# Patient Record
Sex: MALE | Race: BLACK OR AFRICAN AMERICAN | NOT HISPANIC OR LATINO | Employment: UNEMPLOYED | URBAN - METROPOLITAN AREA
[De-identification: names, ages, dates, MRNs, and addresses within clinical notes are randomized per-mention and may not be internally consistent; named-entity substitution may affect disease eponyms.]

---

## 2017-04-07 ENCOUNTER — HOSPITAL ENCOUNTER (EMERGENCY)
Facility: HOSPITAL | Age: 52
Discharge: HOME | End: 2017-04-07
Attending: EMERGENCY MEDICINE
Payer: MEDICAID

## 2017-04-07 VITALS
HEIGHT: 71 IN | WEIGHT: 165 LBS | BODY MASS INDEX: 23.1 KG/M2 | HEART RATE: 71 BPM | TEMPERATURE: 98 F | RESPIRATION RATE: 20 BRPM | SYSTOLIC BLOOD PRESSURE: 121 MMHG | DIASTOLIC BLOOD PRESSURE: 69 MMHG | OXYGEN SATURATION: 100 %

## 2017-04-07 DIAGNOSIS — J03.90 ACUTE TONSILLITIS, UNSPECIFIED ETIOLOGY: Primary | ICD-10-CM

## 2017-04-07 LAB
ALBUMIN SERPL BCP-MCNC: 3.7 G/DL
ALP SERPL-CCNC: 60 U/L
ALT SERPL W/O P-5'-P-CCNC: 17 U/L
ANION GAP SERPL CALC-SCNC: 5 MMOL/L
AST SERPL-CCNC: 15 U/L
BASOPHILS # BLD AUTO: 0.02 K/UL
BASOPHILS NFR BLD: 0.2 %
BILIRUB SERPL-MCNC: 1.6 MG/DL
BUN SERPL-MCNC: 8 MG/DL
CALCIUM SERPL-MCNC: 9.2 MG/DL
CHLORIDE SERPL-SCNC: 104 MMOL/L
CO2 SERPL-SCNC: 29 MMOL/L
CREAT SERPL-MCNC: 0.9 MG/DL
DEPRECATED S PYO AG THROAT QL EIA: NEGATIVE
DIFFERENTIAL METHOD: ABNORMAL
EOSINOPHIL # BLD AUTO: 0.1 K/UL
EOSINOPHIL NFR BLD: 1.2 %
ERYTHROCYTE [DISTWIDTH] IN BLOOD BY AUTOMATED COUNT: 13 %
EST. GFR  (AFRICAN AMERICAN): >60 ML/MIN/1.73 M^2
EST. GFR  (NON AFRICAN AMERICAN): >60 ML/MIN/1.73 M^2
GLUCOSE SERPL-MCNC: 100 MG/DL
HCT VFR BLD AUTO: 44 %
HGB BLD-MCNC: 14.9 G/DL
LYMPHOCYTES # BLD AUTO: 1.4 K/UL
LYMPHOCYTES NFR BLD: 12.1 %
MCH RBC QN AUTO: 30.8 PG
MCHC RBC AUTO-ENTMCNC: 33.9 %
MCV RBC AUTO: 91 FL
MONOCYTES # BLD AUTO: 1 K/UL
MONOCYTES NFR BLD: 8.2 %
NEUTROPHILS # BLD AUTO: 9.3 K/UL
NEUTROPHILS NFR BLD: 78 %
PLATELET # BLD AUTO: 260 K/UL
PMV BLD AUTO: 10.1 FL
POTASSIUM SERPL-SCNC: 4.2 MMOL/L
PROT SERPL-MCNC: 7.4 G/DL
RBC # BLD AUTO: 4.83 M/UL
SODIUM SERPL-SCNC: 138 MMOL/L
WBC # BLD AUTO: 11.88 K/UL

## 2017-04-07 PROCEDURE — 96365 THER/PROPH/DIAG IV INF INIT: CPT

## 2017-04-07 PROCEDURE — 87081 CULTURE SCREEN ONLY: CPT

## 2017-04-07 PROCEDURE — 99284 EMERGENCY DEPT VISIT MOD MDM: CPT | Mod: 25

## 2017-04-07 PROCEDURE — 25500020 PHARM REV CODE 255: Performed by: EMERGENCY MEDICINE

## 2017-04-07 PROCEDURE — 85025 COMPLETE CBC W/AUTO DIFF WBC: CPT

## 2017-04-07 PROCEDURE — 87880 STREP A ASSAY W/OPTIC: CPT

## 2017-04-07 PROCEDURE — 80053 COMPREHEN METABOLIC PANEL: CPT

## 2017-04-07 PROCEDURE — 25000003 PHARM REV CODE 250: Performed by: PHYSICIAN ASSISTANT

## 2017-04-07 RX ORDER — CLINDAMYCIN HYDROCHLORIDE 300 MG/1
300 CAPSULE ORAL 3 TIMES DAILY
Qty: 21 CAPSULE | Refills: 0 | Status: SHIPPED | OUTPATIENT
Start: 2017-04-07 | End: 2017-04-14

## 2017-04-07 RX ORDER — CLINDAMYCIN PHOSPHATE 600 MG/50ML
600 INJECTION, SOLUTION INTRAVENOUS
Status: COMPLETED | OUTPATIENT
Start: 2017-04-07 | End: 2017-04-07

## 2017-04-07 RX ORDER — IBUPROFEN 600 MG/1
600 TABLET ORAL EVERY 6 HOURS PRN
Qty: 20 TABLET | Refills: 0 | Status: SHIPPED | OUTPATIENT
Start: 2017-04-07 | End: 2018-09-08

## 2017-04-07 RX ORDER — TRAMADOL HYDROCHLORIDE 50 MG/1
50 TABLET ORAL EVERY 6 HOURS PRN
Qty: 12 TABLET | Refills: 0 | Status: SHIPPED | OUTPATIENT
Start: 2017-04-07 | End: 2017-04-17

## 2017-04-07 RX ADMIN — CLINDAMYCIN IN 5 PERCENT DEXTROSE 600 MG: 12 INJECTION, SOLUTION INTRAVENOUS at 02:04

## 2017-04-07 RX ADMIN — IOHEXOL 75 ML: 350 INJECTION, SOLUTION INTRAVENOUS at 01:04

## 2017-04-07 NOTE — ED PROVIDER NOTES
Encounter Date: 4/7/2017       History     Chief Complaint   Patient presents with    Sore Throat     pt c/o swollen tonsils since yesterday.  Pt denies fever     Review of patient's allergies indicates:  No Known Allergies  HPI Comments: Rolando Rizvi 52 y.o. Male with no reported PMH or recent medical care presented to the ED with c/o left-sided throat pain for the past one day. He reports that he feels like his tonsils are swollen and that he has lymphadenopathy of the affected side. She c/o pain with swallowing however denies any difficulty swallowing. He denies fever, chills, headache, nausea, vomiting congestion, SOB or cough. He did not try any medications for the symptoms.     The history is provided by the patient.     History reviewed. No pertinent past medical history.  No past surgical history on file.  History reviewed. No pertinent family history.  Social History   Substance Use Topics    Smoking status: None    Smokeless tobacco: None    Alcohol use None     Review of Systems   Constitutional: Positive for appetite change. Negative for activity change, chills and fever.   HENT: Positive for congestion, postnasal drip and sore throat. Negative for facial swelling, trouble swallowing and voice change.    Eyes: Negative for visual disturbance.   Respiratory: Negative for cough, shortness of breath and wheezing.    Cardiovascular: Negative for chest pain.   Gastrointestinal: Negative for abdominal pain, nausea and vomiting.   Genitourinary: Negative for dysuria.   Musculoskeletal: Negative for back pain, neck pain and neck stiffness.   Skin: Negative for rash.   Neurological: Negative for weakness and headaches.   Hematological: Positive for adenopathy. Does not bruise/bleed easily.       Physical Exam   Initial Vitals   BP Pulse Resp Temp SpO2   04/07/17 1123 04/07/17 1121 04/07/17 1121 04/07/17 1121 04/07/17 1121   111/60 80 16 98.9 °F (37.2 °C) 100 %     Physical Exam    Nursing note and vitals  reviewed.  Constitutional: Vital signs are normal. He appears well-developed and well-nourished. He is cooperative.  Non-toxic appearance. He does not appear ill. No distress.   HENT:   Head: Normocephalic and atraumatic.   Right Ear: Tympanic membrane normal.   Left Ear: Tympanic membrane normal.   Nose: Mucosal edema present.   Mouth/Throat: Mucous membranes are normal. No uvula swelling.   erythema of the oropharynx with edema of the left tonsil with slight uvula deviation; no exudate noted.    Eyes: Conjunctivae and lids are normal.   Neck: Trachea normal and normal range of motion. Neck supple. No stridor present. No tracheal deviation present.   Cardiovascular: Normal rate and regular rhythm.   Pulmonary/Chest: Breath sounds normal. No respiratory distress. He has no wheezes. He has no rhonchi.   Abdominal: Soft. Normal appearance.   Musculoskeletal: Normal range of motion.   Lymphadenopathy:        Head (left side): Tonsillar adenopathy present.     He has cervical adenopathy.   Neurological: He is alert and oriented to person, place, and time. He has normal strength. GCS eye subscore is 4. GCS verbal subscore is 5. GCS motor subscore is 6.   Skin: Skin is warm, dry and intact. No rash noted.   Psychiatric: He has a normal mood and affect. His speech is normal and behavior is normal. Thought content normal.         ED Course   Procedures  Labs Reviewed   THROAT SCREEN, RAPID   CBC W/ AUTO DIFFERENTIAL   COMPREHENSIVE METABOLIC PANEL     Imaging Results         CT Soft Tissue Neck With Contrast (Final result) Result time:  04/07/17 13:41:08    Final result by Yasir Teran MD (04/07/17 13:41:08)    Impression:        1.  Findings suggestive of left-sided palatine tonsillitis.  Given the patient's age neoplasm  not totally excluded.  Clinical correlation for acute tonsillitis suggested.  2.  Multiple less than 1 cm left cervical chain lymphadenopathy that could be inflammatory.  Other causes of lymphadenopathy  not excluded.  3.  Rest of examination is unremarkable.      Electronically signed by: GAYLE HDZ MD  Date:     04/07/17  Time:    13:41     Narrative:    History: Left neck pain.    Procedure: Axial CT scans of the head are performed from the mid cranium through to the aortic arch after patient was given intravenous iodinated contrast.  Coronal and sagittal reformats are performed.    Comparison study: None.    Findings were    There is an enlarged left palatine tonsil this measures approximately 2.3 cm in its transverse diameter.  There is a inhomogeneous low-attenuation focus in the palatine tonsil representing changes from tonsillitis.  The peritoneal soft tissues lateral to the tonsil demonstrates no infiltration to suggest peritonsillar abscesses.  The right palatine tonsil and the right peritoneal space is unremarkable.    There are multiple cervical nodes in the level V as well as level III and level IV chain of lymph nodes on the left side.  There are a couple of left supraclavicular nodes.  There are also small less pronounced nodes in the right L5 chain of lymph nodes.  It is felt that these are most likely inflammatory nodes.    The nasopharynx, the parapharyngeal soft tissues,  muscles and the salivary glands are within normal limits bilaterally.  There is normal larynx and the thyroid gland.  Prevertebral soft tissues are unremarkable.  There is spondylosis in the cervical spine.    Incidental note of approximately 2.3 cm subcutaneous cystic mass in the posterior neck at approximately the C3 vertebral body representing a sebaceous cyst is noted.                Rolando Rizvi 52 y.o. 52 Y.o. Male with no reported PMH or recent medical care presented to the ED with c/o left-sided throat pain for the past one day. He reports that he feels like his tonsils are swollen and that he has lymphadenopathy of the affected side. She c/o pain with swallowing however denies any difficulty swallowing. He  denies fever, chills, headache, nausea, vomiting congestion, SOB or cough. He did not try any medications for the symptoms.  ROS positive for sore throat.  Physical exam reveals patient well appearing in no obvious distress. Erythema of the oropharynx with edema of the left tonsil with slight uvula deviation. No uvula swelling, sublingual swelling, posterior oropharyngeal edema or exudate. Left-sided lymphadenopathy noted. Neck with FROM and no rigidity. Lungs clear, heart regular rate and rhythm.     DDX: tonsillitis strep vs. Viral, PTA    ED management: strep screen negative. Labs and CT as suspicion for PTA. CT with findings consistent with palatine tonsillitis; lymphadenopathy noted. Cleocin in the Ed and will send home with same and instructions on follow up.    Impression/Plan: Patient informed of diagnosis The encounter diagnosis was Acute tonsillitis, unspecified etiology.  Discharged with cleocin, motrin and ultram. Patient will follow up with Primary.  Patient cautioned on when to return to ED.  Pt. Understands and agrees with current treatment plan                        Attending Attestation:     Physician Attestation Statement for NP/PA:   I discussed this assessment and plan of this patient with the NP/PA, but I did not personally examine the patient. The face to face encounter was performed by the NP/PA.    Other NP/PA Attestation Additions:    History of Present Illness: ST x 1 day    Medical Decision Making: Tonsillar asymmetry on exam, concern for PTA.   No PTA on CT of neck - treat tonsillitis with cleocin.                 ED Course     Clinical Impression:   The encounter diagnosis was Acute tonsillitis, unspecified etiology.          KEILA Ji  04/10/17 1008       Diane Lopez MD  04/23/17 9530

## 2017-04-07 NOTE — ED AVS SNAPSHOT
[facility]  [address] [place] [address]               Rolando Rizvi   2017 12:22 PM   ED    Description:  Male : 1965   Department:  [facility]           Your Care was Coordinated By:     Provider Role From To    Diane Lopez MD Attending Provider 17 1234 --    KEILA Ji Physician Assistant 17 1228 --      Reason for Visit     Sore Throat           Diagnoses this Visit        Comments    Acute tonsillitis, unspecified etiology    -  Primary left [facility]      ED Disposition     None           To Do List           Follow-up Information     Follow up with [facility]. Go in 3 days.    Specialty:  Family Medicine    Contact information:    [address], [address]  [facility] [address] [phone]       These Medications        Disp Refills Start End    clindamycin (CLEOCIN) 300 MG capsule 21 capsule 0 2017    Take 1 capsule (300 mg total) by mouth 3 (three) times daily. - Oral    ibuprofen (ADVIL,MOTRIN) 600 MG tablet 20 tablet 0 2017     Take 1 tablet (600 mg total) by mouth every 6 (six) hours as needed for Pain. - Oral    tramadol (ULTRAM) 50 mg tablet 12 tablet 0 2017    Take 1 tablet (50 mg total) by mouth every 6 (six) hours as needed for Pain. - Oral      [facility]     [facility] Nurse Care Line - 24/ Assistance  Unless otherwise directed by your provider, please contact [facility], our nurse care line that is available for  assistance.     Registered nurses in the [facility] provide: appointment scheduling, clinical advisement, health education, and other advisory services.  Call: [phone] (toll free)               Medications           Message regarding Medications     Verify the changes and/or additions to your medication regime listed below are the same as discussed with your clinician today.   "If any of these changes or additions are incorrect, please notify your healthcare provider.        START taking these NEW medications        Refills    clindamycin (CLEOCIN) 300 MG capsule 0    Sig: Take 1 capsule (300 mg total) by mouth 3 (three) times daily.    Class: Print    Route: Oral    ibuprofen (ADVIL,MOTRIN) 600 MG tablet 0    Sig: Take 1 tablet (600 mg total) by mouth every 6 (six) hours as needed for Pain.    Class: Print    Route: Oral    tramadol (ULTRAM) 50 mg tablet 0    Sig: Take 1 tablet (50 mg total) by mouth every 6 (six) hours as needed for Pain.    Class: Print    Route: Oral      These medications were administered today        Dose Freq    omnipaque 350 iohexol 75 mL 75 mL IMG once as needed    Sig: Inject 75 mLs into the vein ONCE PRN for contrast.    Class: Normal    Route: Intravenous    clindamycin 600 MG/50 ML D5W 600 mg/50 mL IVPB 600 mg 600 mg ED 1 Time    Sig: Inject 50 mLs (600 mg total) into the vein ED 1 Time.    Class: Normal    Route: Intravenous    Cosign for Ordering: Required by Diane Lopez MD           Verify that the below list of medications is an accurate representation of the medications you are currently taking.  If none reported, the list may be blank. If incorrect, please contact your healthcare provider. Carry this list with you in case of emergency.           Current Medications     clindamycin (CLEOCIN) 300 MG capsule Take 1 capsule (300 mg total) by mouth 3 (three) times daily.    clindamycin 600 MG/50 ML D5W 600 mg/50 mL IVPB 600 mg Inject 50 mLs (600 mg total) into the vein ED 1 Time.    ibuprofen (ADVIL,MOTRIN) 600 MG tablet Take 1 tablet (600 mg total) by mouth every 6 (six) hours as needed for Pain.    tramadol (ULTRAM) 50 mg tablet Take 1 tablet (50 mg total) by mouth every 6 (six) hours as needed for Pain.           Clinical Reference Information           Your Vitals Were     BP Pulse Temp Resp Height Weight    122/77 71 98.7 °F (37.1 °C) (Oral) 20 5' 11" " (1.803 m) 74.8 kg (165 lb)    SpO2 BMI             100% 23.01 kg/m2         Allergies as of 4/7/2017     No Known Allergies      Immunizations Administered on Date of Encounter - 4/7/2017     None      ED Micro, Lab, POCT     Start Ordered       Status Ordering Provider    04/07/17 1125 04/07/17 1124  Rapid strep screen  STAT      Final result     04/07/17 1125 04/07/17 1125  CBC auto differential  STAT      Final result     04/07/17 1125 04/07/17 1125  Comprehensive metabolic panel  STAT      Final result     04/07/17 1124 04/07/17 1124  Strep A culture, throat  Once      In process       ED Imaging Orders     Start Ordered       Status Ordering Provider    04/07/17 1229 04/07/17 1229  CT Soft Tissue Neck With Contrast  1 time imaging      Final result     04/07/17 1125 04/07/17 1125    1 time imaging,   Status:  Canceled      Canceled       Discharge References/Attachments     PHARYNGITIS, REPORT PENDING (ENGLISH)      [facility] Sign-Up     Activating your [facility] account is as easy as 1-2-3!     1) Visit [email or web address], select Sign Up Now, enter this activation code and your date of birth, then select Next.  [email or web address]  Expires: 5/22/2017  2:28 PM      2) Create a username and password to use when you visit [facility] in the future and select a security question in case you lose your password and select Next.    3) Enter your e-mail address and click Sign Up!    Additional Information  If you have questions, please e-mail [email or web address] or call [phone] to talk to our [facility] staff. Remember, [facility] is NOT to be used for urgent needs. For medical emergencies, dial 911.         Smoking Cessation     If you would like to quit smoking:   You may be eligible for free services if you are a [place] resident and started smoking cigarettes before September 1, 1988.  Call the Smoking Cessation [employer] (SCT) toll free at (793) 158-5546 or (917) 231-5036.   Call 2-800-QUIT-NOW if you do not meet  the above criteria.   Contact us via email: [email or web address]   View our website for more information: [email or web address]         [facility] complies with applicable Federal civil rights laws and does not discriminate on the basis of race, color, national origin, age, disability, or sex.        Language Assistance Services     ATTENTION: Language assistance services are available, free of charge. Please call [phone].      ATENCIÓN: Si habla español, tiene a chatterjee disposición servicios gratuitos de asistencia lingüística. Llame al [phone].     CHÚ Ý: N?u b?n nói Ti?ng Vi?t, có các d?ch v? h? tr? ngôn ng? mi?n phí dành cho b?n. G?i s? [phone].

## 2017-04-09 LAB — BACTERIA THROAT CULT: NORMAL

## 2018-09-08 ENCOUNTER — HOSPITAL ENCOUNTER (EMERGENCY)
Facility: HOSPITAL | Age: 53
Discharge: HOME | End: 2018-09-08
Attending: EMERGENCY MEDICINE
Payer: MEDICAID

## 2018-09-08 VITALS
OXYGEN SATURATION: 99 % | HEIGHT: 71 IN | RESPIRATION RATE: 16 BRPM | SYSTOLIC BLOOD PRESSURE: 117 MMHG | WEIGHT: 165 LBS | HEART RATE: 88 BPM | DIASTOLIC BLOOD PRESSURE: 66 MMHG | TEMPERATURE: 100 F | BODY MASS INDEX: 23.1 KG/M2

## 2018-09-08 DIAGNOSIS — R60.9 EDEMA: ICD-10-CM

## 2018-09-08 DIAGNOSIS — L03.115 CELLULITIS OF RIGHT LOWER LEG: Primary | ICD-10-CM

## 2018-09-08 LAB
ALBUMIN SERPL BCP-MCNC: 2.7 G/DL
ALP SERPL-CCNC: 80 U/L
ALT SERPL W/O P-5'-P-CCNC: 19 U/L
ANION GAP SERPL CALC-SCNC: 9 MMOL/L
AST SERPL-CCNC: 20 U/L
BASOPHILS # BLD AUTO: 0 K/UL
BASOPHILS NFR BLD: 0 %
BILIRUB SERPL-MCNC: 0.6 MG/DL
BUN SERPL-MCNC: 10 MG/DL
CALCIUM SERPL-MCNC: 8.7 MG/DL
CHLORIDE SERPL-SCNC: 96 MMOL/L
CO2 SERPL-SCNC: 26 MMOL/L
CREAT SERPL-MCNC: 1 MG/DL
DIFFERENTIAL METHOD: ABNORMAL
EOSINOPHIL # BLD AUTO: 0 K/UL
EOSINOPHIL NFR BLD: 0.2 %
ERYTHROCYTE [DISTWIDTH] IN BLOOD BY AUTOMATED COUNT: 12.7 %
EST. GFR  (AFRICAN AMERICAN): >60 ML/MIN/1.73 M^2
EST. GFR  (NON AFRICAN AMERICAN): >60 ML/MIN/1.73 M^2
GLUCOSE SERPL-MCNC: 249 MG/DL
HCT VFR BLD AUTO: 35.5 %
HGB BLD-MCNC: 11.8 G/DL
LYMPHOCYTES # BLD AUTO: 0.8 K/UL
LYMPHOCYTES NFR BLD: 8.9 %
MCH RBC QN AUTO: 29.8 PG
MCHC RBC AUTO-ENTMCNC: 33.2 G/DL
MCV RBC AUTO: 90 FL
MONOCYTES # BLD AUTO: 0.4 K/UL
MONOCYTES NFR BLD: 4.9 %
NEUTROPHILS # BLD AUTO: 7.2 K/UL
NEUTROPHILS NFR BLD: 85.6 %
PLATELET # BLD AUTO: 218 K/UL
PMV BLD AUTO: 10.1 FL
POCT GLUCOSE: 246 MG/DL (ref 70–110)
POTASSIUM SERPL-SCNC: 3.5 MMOL/L
PROT SERPL-MCNC: 6.6 G/DL
RBC # BLD AUTO: 3.96 M/UL
SODIUM SERPL-SCNC: 131 MMOL/L
WBC # BLD AUTO: 8.38 K/UL

## 2018-09-08 PROCEDURE — 96365 THER/PROPH/DIAG IV INF INIT: CPT

## 2018-09-08 PROCEDURE — 85025 COMPLETE CBC W/AUTO DIFF WBC: CPT

## 2018-09-08 PROCEDURE — 25000003 PHARM REV CODE 250: Performed by: PHYSICIAN ASSISTANT

## 2018-09-08 PROCEDURE — 82962 GLUCOSE BLOOD TEST: CPT

## 2018-09-08 PROCEDURE — 99284 EMERGENCY DEPT VISIT MOD MDM: CPT | Mod: 25

## 2018-09-08 PROCEDURE — S0077 INJECTION, CLINDAMYCIN PHOSP: HCPCS | Performed by: PHYSICIAN ASSISTANT

## 2018-09-08 PROCEDURE — 80053 COMPREHEN METABOLIC PANEL: CPT

## 2018-09-08 RX ORDER — TRAMADOL HYDROCHLORIDE 50 MG/1
50 TABLET ORAL EVERY 6 HOURS PRN
Qty: 12 TABLET | Refills: 0 | Status: SHIPPED | OUTPATIENT
Start: 2018-09-08 | End: 2018-09-18

## 2018-09-08 RX ORDER — IBUPROFEN 600 MG/1
600 TABLET ORAL EVERY 6 HOURS PRN
Qty: 20 TABLET | Refills: 0 | Status: SHIPPED | OUTPATIENT
Start: 2018-09-08 | End: 2019-02-06

## 2018-09-08 RX ORDER — CLINDAMYCIN PHOSPHATE 600 MG/50ML
600 INJECTION, SOLUTION INTRAVENOUS
Status: COMPLETED | OUTPATIENT
Start: 2018-09-08 | End: 2018-09-08

## 2018-09-08 RX ORDER — CLINDAMYCIN HYDROCHLORIDE 300 MG/1
300 CAPSULE ORAL 3 TIMES DAILY
Qty: 21 CAPSULE | Refills: 0 | Status: SHIPPED | OUTPATIENT
Start: 2018-09-08 | End: 2018-09-15

## 2018-09-08 RX ORDER — ASPIRIN 325 MG
325 TABLET, DELAYED RELEASE (ENTERIC COATED) ORAL DAILY
COMMUNITY

## 2018-09-08 RX ADMIN — CLINDAMYCIN IN 5 PERCENT DEXTROSE 600 MG: 12 INJECTION, SOLUTION INTRAVENOUS at 01:09

## 2018-09-08 RX ADMIN — SODIUM CHLORIDE 1000 ML: 0.9 INJECTION, SOLUTION INTRAVENOUS at 02:09

## 2018-09-08 NOTE — ED NOTES
RN asked Irene PEDRAZA if blood cultures were needed before initiation of antibiotics; verbal order received that no blood cultures need to be drawn on patient.

## 2018-09-08 NOTE — ED PROVIDER NOTES
Encounter Date: 9/8/2018       History     Chief Complaint   Patient presents with    Leg Swelling     RLE swollen and warm to touch x 2 days, denies trauma      Afebrile 53-year-old male who is a daily smoker with no reported past medical history or recent medical care presents the ED for evaluation of right lower leg pain.  He did have any recent trauma and states that over the past 2 days he began with some gradual swelling and pain of the lower leg.  He states that since it is onset the symptoms have gradually worsened.  He does also report redness and warmth of the area.  He complains of associated subjective fever.  He denies any other symptoms including chills, chest pain, shortness of breath, nausea, vomiting, diarrhea or pain radiation.  He is able to ambulate on affected lower extremity and denies any numbness, tingling or decreased range of motion. He has not tried any medications for the symptoms.      The history is provided by the patient.     Review of patient's allergies indicates:  No Known Allergies  No past medical history on file.  No past surgical history on file.  No family history on file.  Social History     Tobacco Use    Smoking status: Current Every Day Smoker   Substance Use Topics    Alcohol use: Yes     Alcohol/week: 12.0 oz     Types: 20 Cans of beer per week    Drug use: Not on file     Review of Systems   Constitutional: Positive for fever (subjective). Negative for chills.   HENT: Negative for trouble swallowing.    Eyes: Negative for visual disturbance.   Respiratory: Negative for chest tightness and shortness of breath.    Cardiovascular: Negative for chest pain.   Gastrointestinal: Negative for nausea and vomiting.   Genitourinary: Negative for dysuria.   Musculoskeletal: Negative for arthralgias, back pain and joint swelling.        Right lower extremity pain   Skin: Positive for color change. Negative for rash.         chronic skin changes noted to bilateral feet    Allergic/Immunologic: Negative for immunocompromised state.   Neurological: Negative for weakness and numbness.   Hematological: Does not bruise/bleed easily.       Physical Exam     Initial Vitals [09/08/18 1300]   BP Pulse Resp Temp SpO2   130/64 104 16 98.6 °F (37 °C) 96 %      MAP       --         Physical Exam    Nursing note and vitals reviewed.  Constitutional: Vital signs are normal. He appears well-developed and well-nourished. He is cooperative.  Non-toxic appearance. He does not appear ill.   HENT:   Head: Normocephalic and atraumatic.   Eyes: Conjunctivae and lids are normal.   Neck: Trachea normal and normal range of motion. Neck supple. No stridor present. No tracheal deviation present.   Cardiovascular: Normal rate and regular rhythm.   Pulmonary/Chest: Breath sounds normal. No respiratory distress.   Abdominal: Soft. Normal appearance.   Musculoskeletal:        Right knee: He exhibits normal range of motion. No tenderness found.        Right lower leg: He exhibits tenderness and edema.        Legs:       Right foot: There is normal range of motion, no tenderness, normal capillary refill and no crepitus.   Patient has circumferential edema and erythema of the right lower leg just inferior to the knee and superior to the ankle.  He does have TTP and warmth of the area.  No obvious open wound, drainage, bleeding are weeping.  Patient does have chronic tinea changes of bilateral feet.  Distal pulses intact.  Capillary refill intact.   Neurological: He is alert and oriented to person, place, and time. GCS eye subscore is 4. GCS verbal subscore is 5. GCS motor subscore is 6.   Skin: Skin is warm, dry and intact. No rash noted.   Psychiatric: He has a normal mood and affect. His speech is normal and behavior is normal. Thought content normal.         ED Course   Procedures  Labs Reviewed   CBC W/ AUTO DIFFERENTIAL - Abnormal; Notable for the following components:       Result Value    RBC 3.96 (*)      Hemoglobin 11.8 (*)     Hematocrit 35.5 (*)     Lymph # 0.8 (*)     Gran% 85.6 (*)     Lymph% 8.9 (*)     All other components within normal limits   COMPREHENSIVE METABOLIC PANEL - Abnormal; Notable for the following components:    Sodium 131 (*)     Glucose 249 (*)     Albumin 2.7 (*)     All other components within normal limits   POCT GLUCOSE - Abnormal; Notable for the following components:    POCT Glucose 246 (*)     All other components within normal limits          Imaging Results          X-Ray Tibia Fibula 2 View Right (Final result)  Result time 09/08/18 13:52:57    Final result by Elena Phillips MD (09/08/18 13:52:57)                 Impression:      As above.      Electronically signed by: Elena Phillips MD  Date:    09/08/2018  Time:    13:52             Narrative:    EXAMINATION:  XR TIBIA FIBULA 2 VIEW RIGHT    CLINICAL HISTORY:  Edema, unspecified    TECHNIQUE:  AP and lateral views of the right tibia and fibula were performed.    COMPARISON:  None.    FINDINGS:  There is significant soft tissue swelling about the visualized right lower extremity without underlying fracture or dislocation.  No osseous erosions are seen.                                 Medical Decision Making:   Initial Assessment:   [id number] presents the ED for evaluation of right lower leg pain and swelling. Patient denies any known trauma or known insect bite/sting.  Physical exam reveals well-appearing male in no obvious distress. Patient has circumferential edema and erythema of the right lower leg just inferior to the knee and superior to the ankle.  He does have TTP and warmth of the area.  No obvious open wound, drainage, bleeding are weeping.  Patient does have chronic tinea changes of bilateral feet.  Distal pulses intact.  Capillary refill intact.  Differential Diagnosis:   Differential Diagnosis includes, but is not limited to:  Fracture, dislocation, cellulitis nerve injury/palsy, local reaction, osteomyelitis  muscle strain, ligament tear/sprain, soft tissue contusion, osteoarthritis.     Clinical Tests:   Lab Tests: Ordered and Reviewed  Radiological Study: Ordered and Reviewed  ED Management:  Patient does appear to have clinical exam findings consistent with cellulitis; did consider DVT although less likely as patient has circumferential swelling, warmth and edema consistent with infectious process.  Unsure of etiology although he does have chronic appearing wounds of bilateral feet with varying stages of healing.  As patient has had no recent medical care and is unsure of his past medical history we will evaluate for for some basic labs to rule out any underlying immunocompromised state.  Labs significant for elevated blood glucose at 249 however patient did eat lunch just prior to arrival.  No leukocytosis or leukopenia.  X-ray with no significant acute abnormalities other than soft tissue swelling. Patient was given IV fluids and clindamycin the ED with reported improvement in pain. I do believe patient is a good candidate for outpatient antibiotics with close follow-up.  We will also send patient home with anti-inflammatory and short course of medication for pain. He was given information for Family Practice. Strict instructions to follow up with primary care physician or reference provided for further assessment and evaluation. Given instructions to return for any acute symptoms and verbalized understanding of this medical plan.                        Clinical Impression:   The primary encounter diagnosis was Cellulitis of right lower leg. A diagnosis of Edema was also pertinent to this visit.                             KEILA Ji  09/08/18 1478
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
## 2018-09-27 ENCOUNTER — OFFICE VISIT (OUTPATIENT)
Dept: FAMILY MEDICINE | Facility: HOSPITAL | Age: 53
End: 2018-09-27
Attending: FAMILY MEDICINE
Payer: MEDICAID

## 2018-09-27 VITALS
WEIGHT: 149.5 LBS | SYSTOLIC BLOOD PRESSURE: 131 MMHG | BODY MASS INDEX: 20.93 KG/M2 | HEART RATE: 86 BPM | DIASTOLIC BLOOD PRESSURE: 73 MMHG | HEIGHT: 71 IN

## 2018-09-27 DIAGNOSIS — Z72.0 TOBACCO ABUSE: ICD-10-CM

## 2018-09-27 DIAGNOSIS — L03.115 CELLULITIS OF RIGHT LOWER EXTREMITY: Primary | ICD-10-CM

## 2018-09-27 DIAGNOSIS — Z12.11 SCREEN FOR COLON CANCER: ICD-10-CM

## 2018-09-27 DIAGNOSIS — Z23 NEED FOR PROPHYLACTIC VACCINATION AND INOCULATION AGAINST INFLUENZA: ICD-10-CM

## 2018-09-27 DIAGNOSIS — E11.8 TYPE 2 DIABETES MELLITUS WITH COMPLICATION, UNSPECIFIED WHETHER LONG TERM INSULIN USE: ICD-10-CM

## 2018-09-27 DIAGNOSIS — Z72.51 HIGH RISK SEXUAL BEHAVIOR: ICD-10-CM

## 2018-09-27 PROCEDURE — 99213 OFFICE O/P EST LOW 20 MIN: CPT | Mod: 25

## 2018-09-27 PROCEDURE — 90686 IIV4 VACC NO PRSV 0.5 ML IM: CPT

## 2018-09-27 NOTE — PROGRESS NOTES
Subjective:       Patient ID: Rolando Rizvi is a 53 y.o. male.    Chief Complaint: cellulitis f/u     HPI Mr. Rizvi is a 54 YO M with pmh of R leg cellulitis s/p a course of clindamycin who presents for f/u. Pt claims that he feels well and has no new complaints today. He denies fever, chills, leg pain, leg swelling.     PMH: Pt denies any PMH although has poor healthcare followup in the past    PSx: denies any PSx    Med/Alg: NKDA Pt takes ASA daily, pt is s/p course of clindamycin 300 mg TID for R leg cellulitis    Fam Hx: Pt denies fam hx of heart disease, HTN, DM.    Soc: Pt has smoked .25 packs a day for 15 yrs (3.75 pack yr hx), Drinks 3-4 16 oz beers a day, and denies any other drug use.    Review of Systems   Constitutional: Negative for activity change, appetite change, chills, diaphoresis, fatigue, fever and unexpected weight change.   HENT: Negative for congestion, nosebleeds, rhinorrhea, sinus pressure, sinus pain, sneezing, sore throat and trouble swallowing.    Eyes: Negative for pain, discharge, redness, itching and visual disturbance.   Respiratory: Negative for apnea, choking, chest tightness, shortness of breath and wheezing.    Cardiovascular: Negative for chest pain, palpitations and leg swelling.   Gastrointestinal: Negative for abdominal distention, abdominal pain, anal bleeding, blood in stool, constipation, diarrhea, nausea, rectal pain and vomiting.   Endocrine: Negative for cold intolerance, heat intolerance, polydipsia, polyphagia and polyuria.   Genitourinary: Negative for difficulty urinating, dysuria, flank pain, frequency and urgency.   Musculoskeletal: Negative for arthralgias, back pain, gait problem, joint swelling, myalgias, neck pain and neck stiffness.   Skin: Negative for color change, pallor, rash and wound.   Neurological: Negative for dizziness, tremors, seizures, syncope, facial asymmetry, speech difficulty, weakness, light-headedness, numbness and headaches.    Psychiatric/Behavioral: Negative for agitation and behavioral problems.       Objective:    Pt previous labs unremarkable with exception of blood glucose over 280 in previous hospitalization for cellulitis.   Vitals:    09/27/18 1549   BP: 131/73   Pulse: 86     Physical Exam   Constitutional: He is oriented to person, place, and time. He appears well-developed and well-nourished.   HENT:   Head: Normocephalic and atraumatic.   Eyes: EOM are normal. Right eye exhibits no discharge. Left eye exhibits no discharge.   Neck: Normal range of motion. Neck supple.   Cardiovascular: Regular rhythm.   Pulmonary/Chest: Effort normal and breath sounds normal.   Abdominal: Soft. Bowel sounds are normal.   Musculoskeletal: Normal range of motion. He exhibits no tenderness or deformity.   Neurological: He is alert and oriented to person, place, and time.   Skin: Skin is warm and dry. No erythema.   Psychiatric: He has a normal mood and affect.       Assessment:       1. Cellulitis of right lower extremity    2. Need for prophylactic vaccination and inoculation against influenza    3. High risk sexual behavior    4. Type 2 diabetes mellitus with complication, unspecified whether long term insulin use    5. Screen for colon cancer    6. Tobacco abuse        Plan:     Cellulitis of right lower extremity  Appears healed no more infection    DM2  -     Lipid panel; Future; Expected date: 09/27/2018  -     Hemoglobin A1c; Future; Expected date: 09/27/2018  -     TSH; Future; Expected date: 09/27/2018    Need for prophylactic vaccination and inoculation against influenza  -     Influenza - Quadrivalent (3 years & older) (PF)    Screen for colon cancer  Request for colonoscopy sent for 10/31/18    Tobacco use  -counseling    f/u 1 week before colonoscopy

## 2018-10-01 NOTE — PROGRESS NOTES
I have reviewed the notes, assessments, and/or procedures performed, I concur with her/his documentation of Rolando Rizvi.

## 2019-02-06 ENCOUNTER — OFFICE VISIT (OUTPATIENT)
Dept: FAMILY MEDICINE | Facility: HOSPITAL | Age: 54
End: 2019-02-06
Attending: FAMILY MEDICINE
Payer: MEDICAID

## 2019-02-06 VITALS
BODY MASS INDEX: 22.07 KG/M2 | WEIGHT: 157.63 LBS | HEIGHT: 71 IN | DIASTOLIC BLOOD PRESSURE: 68 MMHG | SYSTOLIC BLOOD PRESSURE: 118 MMHG | HEART RATE: 77 BPM

## 2019-02-06 DIAGNOSIS — M75.82 ROTATOR CUFF TENDONITIS, LEFT: Primary | ICD-10-CM

## 2019-02-06 PROCEDURE — 99214 OFFICE O/P EST MOD 30 MIN: CPT | Performed by: STUDENT IN AN ORGANIZED HEALTH CARE EDUCATION/TRAINING PROGRAM

## 2019-02-06 RX ORDER — MELOXICAM 7.5 MG/1
7.5 TABLET ORAL DAILY PRN
Qty: 30 TABLET | Refills: 0 | Status: SHIPPED | OUTPATIENT
Start: 2019-02-06 | End: 2019-03-08

## 2019-02-06 RX ORDER — CYCLOBENZAPRINE HCL 5 MG
5 TABLET ORAL 3 TIMES DAILY PRN
Qty: 30 TABLET | Refills: 0 | Status: SHIPPED | OUTPATIENT
Start: 2019-02-06 | End: 2019-02-16

## 2019-02-06 RX ORDER — LIDOCAINE 50 MG/G
1 PATCH TOPICAL DAILY PRN
Qty: 15 PATCH | Refills: 0 | Status: SHIPPED | OUTPATIENT
Start: 2019-02-06

## 2019-02-06 NOTE — PROGRESS NOTES
Subjective:       Patient ID: Rolando Rizvi is a 53 y.o. male.    Chief Complaint: Left Shoulder Pain    HPI   Mr. Rizvi is a 52 yo AA M w/ no significant pmhx and does not take any prescription medications who presents to the clinic for evaluation of Left shoulder pain. Noted the pain x 2 weeks. Denies any traumatic event. Works at restaurant w/ repetitive motions. Denies any h/o previous left shoulder injury. No treatment thus far. Denies any red flags signs/symptoms including but not limited to bowel/bladder incontinence or gait abnormalities.     Review of Systems    +Left shoulder pain exacerbated with movement and resistance such as moving boxes.   Constitutional: Negative for chills, diaphoresis, fatigue, fever and unexpected weight change.   HENT: Negative for congestion, ear pain, mouth sores, nosebleeds, postnasal drip, rhinorrhea, sinus pressure, sinus pain, sneezing, sore throat and trouble swallowing.    Eyes: Negative for photophobia, pain and redness.   Respiratory: Negative for cough, chest tightness, shortness of breath, wheezing and stridor.    Cardiovascular: Negative for chest pain and palpitations.   Gastrointestinal: Negative for abdominal pain, constipation, diarrhea, nausea and vomiting.   Endocrine: Negative for heat intolerance, polyphagia and polyuria.       Objective:      Vitals:    02/06/19 1540   BP: 118/68   Pulse: 77     Physical Exam    Constitutional: He is oriented to person, place, and time. He appears well-developed and well-nourished.   HENT:   Head: Normocephalic and atraumatic.   Eyes: EOM are normal. Right eye exhibits no discharge. Left eye exhibits no discharge.   Neck: Normal range of motion. Neck supple.   Cardiovascular: Regular rhythm.   Pulmonary/Chest: Effort normal and breath sounds normal.   Abdominal: Soft. Bowel sounds are normal.   Musculoskeletal: + impingement test to L shoulder with weakness noted to external rotation and supraspinatus function. SILT C5-T1  grossly intact. Radial pulse 2+. No radicular symptoms noted. Negative Spurling test.   Neurological: He is alert and oriented to person, place, and time.   Skin: Skin is warm and dry. No erythema.   Psychiatric: He has a normal mood and affect    Assessment:       1. Rotator cuff tendonitis, left        Plan:       Rotator cuff tendonitis, left  -     meloxicam (MOBIC) 7.5 MG tablet; Take 1 tablet (7.5 mg total) by mouth daily as needed for Pain.  Dispense: 30 tablet; Refill: 0  -     cyclobenzaprine (FLEXERIL) 5 MG tablet; Take 1 tablet (5 mg total) by mouth 3 (three) times daily as needed for Muscle spasms.  Dispense: 30 tablet; Refill: 0  -     X-Ray Cervical Spine AP And Lateral; Future; Expected date: 02/06/2019  -     lidocaine (LIDODERM) 5 %; Place 1 patch onto the skin daily as needed. Remove & Discard patch within 12 hours or as directed by MD  Dispense: 15 patch; Refill: 0      Follow-up in about 4 weeks (around 3/6/2019).

## 2019-02-06 NOTE — PATIENT INSTRUCTIONS
Shoulder Impingement Syndrome  The rotator cuff is a group of muscles and tendons that surround the shoulder joint. These muscles and tendons hold the arm in its joint. They help the shoulder move. The rotator cuff muscles and tendons can become irritated from repeated rubbing against the shoulder bone. This is called shoulder impingement syndrome or rotator cuff tendonitis.     If your case is mild, you may only need to rest the shoulder and then do certain exercises to strengthen the muscles. You can also take anti-inflammatory medicines. Steroid injections into the shoulder can ease inflammation. But you can have only a limited number of these. If the condition gets worse, your shoulder muscles may become thin and weak. This can lead to a rotator cuff tear.  Symptoms of shoulder impingement syndrome may include:  · Shoulder pain that gets worse when you raise your arm overhead  · Weakness of the shoulder muscles when you use your arm overhead  · Popping and clicking when you move your shoulder  · Shoulder pain that wakes you up at night, especially when you sleep on the affected shoulder  · Sudden pain in your shoulder when you lift or reach  Home care  Follow these tips to take care of yourself at home:  · Avoid activities that make your pain worse. These include raising your arms overhead, repeating the same motion over and over, or lifting heavy objects.  · Dont hold your arm in one position for a long time. Keep it moving.  · Put an ice pack on the sore area for 20 minutes every 1 to 2 hours for the first day. You can make an ice pack by putting ice cubes in a plastic bag. Wrap the bag in a towel before putting it on your shoulder. A frozen bag of peas or something similar can also be used as an ice pack. Use the ice packs 3 to 4 times a day for the next 2 days. Continue using the ice to relieve of pain and swelling as needed.  · You may take acetaminophen or ibuprofen to control pain, unless another  medicine was prescribed. If prednisone was prescribed, dont take anti-inflammatory medicines. If you have chronic liver or kidney disease or ever had a stomach ulcer or gastrointestinal bleeding, talk with your doctor before using these medicines.  · After your symptoms ease, you may get physical therapy or start a home exercise program. This can strengthen your shoulder muscles and help your range of motion. Talk with your doctor about what is best for your condition.  Follow-up care  Follow up with your healthcare provider, or as advised.  When to seek medical advice  Call your healthcare provider right away if any of these occur:  · Shoulder pain that gets worse and wakes you up at night  · Your shoulder or arm swells  · Numbness, tingling, or pain that travels down the arm to the hand  · Loss of shoulder strength  · Fever or chills  Date Last Reviewed: 8/1/2016  © 5740-1695 "[employer]. [address] [place] [id number]. All rights reserved. This information is not intended as a substitute for professional medical care. Always follow your healthcare professional's instructions.        Preventing Repetitive Motion Injury: Shoulder    Making changes in how you use your shoulder can lessen your chances of repetitive motion injuries (RMIs). Use your shoulder wisely by following the tips below.  Position yourself well  ?Here are suggestions to prevent RMIs:  · Avoid raising your arms when working above shoulder level.  · Use a stool or stepladder when needed to prevent awkward reaching.  · Keep your work within easy reach. This helps you avoid stretching or twisting your arms and shoulders.  Vary your tasks  Avoid repetition in the following ways:  · Vary your on-the-job activity to help reduce the risk of RMIs.  · Give your shoulder enough time to rest and recover from stressful tasks.  · If one task causes you to feel pain, stop. Rest your shoulder. Go to another task if possible.  Limit  force  Here are tips to reduce strain:  · Ask for help when you need it.  · Limit activities that could strain shoulder muscles and tendons. These include heavy lifting, pushing, and pulling especially overhead.. Get help when needed, or use dollies and wheelbarrows.  · Find the best tools for each activity. The best tool lets you use the least force.  · Rest before repeating a task that requires a lot of force. The more frequent the force, the greater the risk of RMIs.  Date Last Reviewed: 10/14/2015  © 5900-1516 [employer]. [address] [place] [id number]. All rights reserved. This information is not intended as a substitute for professional medical care. Always follow your healthcare professional's instructions.        Exercises at Your Workstation: Shoulders  Tight shoulders? Aching back? A few easy moves can help your shoulders and back feel better. Take a few minutes during your day to do these exercises, right at your desk. They'll loosen up your muscles, keep you more alert, and make a big difference in how you work and feel.  Breathe deeply as you do your exercises. Inhale through your nose, and exhale through your mouth.        Shoulder raise Back press   For your shoulders  Warm-up  · Drop your head gently to your chest. While breathing in, slowly roll your head up to your left shoulder. While breathing out, slowly roll your head back to center. Repeat to the right.  · Repeat 3 times on each side.  Shoulder raise  · Slowly raise your shoulders toward your ears. Hold for a few seconds.  · Slowly bring your shoulders down and relax.  · Repeat 3 times.  Back press  · Put your hands up, forearms raised.  · Push your arms back, squeezing your shoulder blades. Hold for a few seconds, then relax.  · Repeat 3 times.  If you feel pain while doing these stretching exercises, please stop and consult your doctor.   Date Last Reviewed: 12/28/2015  © 3200-4552 [employer]. [place] [address]. All rights reserved. This information is not intended as a substitute for professional medical care. Always follow your healthcare professional's instructions.        R.I.C.E.    R.I.C.E. stands for Rest, Ice, Compression, and Elevation. Doing these things helps limit pain and swelling after an injury. R.I.C.E. also helps injuries heal faster. Use R.I.C.E. for sprains, strains, and severe bruises or bumps. Follow the tips on this handout and begin R.I.C.E. as soon as possible after an injury.  ? Rest  Pain is your bodys way of telling you to rest an injured area. Whether you have hurt an elbow, hand, foot, or knee, limiting its use will prevent further injury and help you heal.  ? Ice  Applying ice right after an injury helps prevent swelling and reduce pain. Dont place ice directly on your skin.  · Wrap a cold pack or bag of ice in a thin cloth. Place it over the injured area.  · Ice for 10 minutes every 3 hours. Dont ice for more than 20 minutes at a time.  ? Compression  Putting pressure (compression) on an injury helps prevent swelling and provides support.  · Wrap the injured area firmly with an elastic bandage. If your hand or foot tingles, becomes discolored, or feels cold to the touch, the bandage may be too tight. Rewrap it more loosely.  · If your bandage becomes too loose, rewrap it.  · Do not wear an elastic bandage overnight.  ? Elevation  Keeping an injury elevated helps reduce swelling, pain, and throbbing. Elevation is most effective when the injury is kept elevated higher than the heart.     Call your healthcare provider if you notice any of the following:  · Fingers or toes feel numb, are cold to the touch, or change color  · Skin looks shiny or tight  · Pain, swelling, or bruising worsens and is not improved with elevation   Date Last Reviewed: 9/3/2015  © 4402-6258 [employer]. [address] [id number]. All rights reserved.  This information is not intended as a substitute for professional medical care. Always follow your healthcare professional's instructions.        Shoulder and Upper Back Stretch  To start, stand tall with your ears, shoulders, and hips in line. Your feet should be slightly apart, positioned just under your hips. Focus your eyes directly in front of you.  this position for a few seconds before starting your exercise. This helps increase your awareness of proper posture.          Reach overhead and slightly back with both arms. Keep your shoulders and neck aligned and your elbows behind your shoulders:  · With your palms facing the ceiling, turn your fingers inward.  · Take a deep breath. Breathe out, and lower your elbows toward your buttocks. Hold for 5 seconds, then return to starting position.  · Repeat 3 times.  Date Last Reviewed: 8/16/2015  © 0251-3452 [employer]. [address] [place] [address]. All rights reserved. This information is not intended as a substitute for professional medical care. Always follow your healthcare professional's instructions.        Exercises for Shoulder Flexibility: External Rotation    This stretch can help restore shoulder flexibility and relieve pain over time. When stretching, be sure to breathe deeply. Follow any special instructions from your doctor or physical therapist:  1.  a doorway. Grasp the doorjamb with the hand on the frozen side. Your arm should be bent.  2. With the other hand, hold the elbow on the frozen side firmly against your body.  3. Standing in the same spot, rotate your body away from the doorjamb. Stop when you feel the stretch in the shoulder. At first, try to hold the stretch for 5 seconds.  4. Work up to doing 3 sets of this stretch, 3 times a day. Work up to holding the stretch for 30 to 60 seconds.  Note: Keep your arms as still as you can. Over time, rotate your body a little more to enhance the stretch. But  be careful not to twist your back.  Frozen shoulder  Frozen shoulder is another name for adhesive capsulitis, which causes restricted movement in the shoulder. If you have frozen shoulder, this stretch may cause discomfort, especially when you first get started. A few months may pass before you achieve the results you want. But once your shoulder heals, it rarely becomes frozen again. So stick to your stretching program. If you have any questions, be sure to ask your doctor.   Date Last Reviewed: 8/16/2015 © 2000-2017 [employer]. [address] [place] [address]. All rights reserved. This information is not intended as a substitute for professional medical care. Always follow your healthcare professional's instructions.        Exercises for Shoulder Flexibility: Adduction (Reaching Across)    This stretch can help restore shoulder flexibility and relieve pain over time. When stretching, be sure to breathe deeply. And follow any special instructions from your doctor or physical therapist:  5. Put the hand from the side you want to stretch on your opposite shoulder. Your elbow should point away from your body. Try to raise your elbow as close to shoulder height as you can.  6. With your other hand, push the raised elbow toward the opposite shoulder. Avoid turning your head. Stop when you feel the stretch. Try to hold the stretch for 5 seconds.  7. Work up to doing 3 sets of this stretch, 3 times a day. Work up to holding the stretch for 30 to 60 seconds.  Note: Be sure to push your elbow across your chest, not up toward your chin. Over time, try to push your elbow farther across your chest to enhance the stretch.  Frozen shoulder  Frozen shoulder is another name for adhesive capsulitis, which causes restricted movement in the shoulder. If you have frozen shoulder, this stretch may cause discomfort, especially when you first get started. A few months may pass before you achieve the results you  want. Once your shoulder heals, it rarely becomes frozen again. So stick to your stretching program. If you have any questions, be sure to ask your doctor.   Date Last Reviewed: 8/16/2015  © 3225-3912 [employer]. [address] [place] [address]. All rights reserved. This information is not intended as a substitute for professional medical care. Always follow your healthcare professional's instructions.        Doorway Pectoral Stretch (Flexibility)    8.  an open doorway. Raise each arm up to the side, bent at 90-degree angles with palms forward. Rest your palms on the door frame.  9. Slowly step forward with one foot. Feel the stretch in your shoulders and chest. Stand upright and dont lean forward.  10. Hold for 30 seconds. Step back and relax.  11. Repeat 3 times, or as instructed.  Date Last Reviewed: 3/10/2016  © 0598-9912 [employer]. [address] [place] [address]. All rights reserved. This information is not intended as a substitute for professional medical care. Always follow your healthcare professional's instructions.        Pendulum (Flexibility)    12. Lean over next to a table, with your left arm supporting your weight on the table.  13. Relax your right arm and let it hang straight down.  14. Slowly begin to swing your right arm in a small [place]. Gradually make the [place] bigger if you can. Change direction after 1 minute of motion.  15. Next, swing your right arm backward and forward. Then move it side to side. Change direction after 1 minute of motion.  16. Repeat these movements for about 5 minutes.  17. Do this exercise 3 times a day, or as often as instructed.  Date Last Reviewed: 3/10/2016  © 6543-0015 [employer]. [address] [place] [address]. All rights reserved. This information is not intended as a substitute for professional medical care. Always follow your healthcare professional's  instructions.        Standing Scapular Pinch    18. Stand up straight. Raise each arm up to the side, bent at 90-degree angles with palms forward.  19. Squeeze your shoulders back, pushing your shoulder blades toward each other. Hold for 5 seconds.  20. Relax. Repeat 5 times, or as instructed.  Date Last Reviewed: 3/10/2016  © 4868-5337 [employer]. [address] [place] [address], [place] [address]. All rights reserved. This information is not intended as a substitute for professional medical care. Always follow your healthcare professional's instructions.

## 2019-05-14 DIAGNOSIS — Z12.11 COLON CANCER SCREENING: ICD-10-CM

## 2020-07-29 ENCOUNTER — HOSPITAL ENCOUNTER (OUTPATIENT)
Dept: RADIOLOGY | Facility: HOSPITAL | Age: 55
Discharge: HOME | End: 2020-07-29
Attending: INTERNAL MEDICINE
Payer: MEDICAID

## 2020-07-29 DIAGNOSIS — M54.9 DORSALGIA, UNSPECIFIED: ICD-10-CM

## 2020-07-29 DIAGNOSIS — R05.9 COUGH: Primary | ICD-10-CM

## 2020-07-29 DIAGNOSIS — R06.02 SHORTNESS OF BREATH: Primary | ICD-10-CM

## 2020-07-29 DIAGNOSIS — R05.9 COUGH: ICD-10-CM

## 2020-07-29 PROCEDURE — 72110 XR LUMBAR SPINE AP AND LAT WITH FLEX/EXT: ICD-10-PCS | Mod: 26,,, | Performed by: RADIOLOGY

## 2020-07-29 PROCEDURE — 71046 X-RAY EXAM CHEST 2 VIEWS: CPT | Mod: TC,FY

## 2020-07-29 PROCEDURE — 72100 X-RAY EXAM L-S SPINE 2/3 VWS: CPT | Mod: TC,FY

## 2020-07-29 PROCEDURE — 71046 X-RAY EXAM CHEST 2 VIEWS: CPT | Mod: 26,,, | Performed by: RADIOLOGY

## 2020-07-29 PROCEDURE — 72110 X-RAY EXAM L-2 SPINE 4/>VWS: CPT | Mod: 26,,, | Performed by: RADIOLOGY

## 2020-07-29 PROCEDURE — 71046 XR CHEST PA AND LATERAL: ICD-10-PCS | Mod: 26,,, | Performed by: RADIOLOGY

## 2021-03-26 ENCOUNTER — HOSPITAL ENCOUNTER (EMERGENCY)
Facility: HOSPITAL | Age: 56
Discharge: LEFT BEFORE DISCHARGE | End: 2021-03-26
Payer: MEDICAID

## 2021-03-26 VITALS
WEIGHT: 156 LBS | HEART RATE: 106 BPM | OXYGEN SATURATION: 98 % | BODY MASS INDEX: 21.84 KG/M2 | HEIGHT: 71 IN | SYSTOLIC BLOOD PRESSURE: 134 MMHG | DIASTOLIC BLOOD PRESSURE: 74 MMHG | RESPIRATION RATE: 18 BRPM | TEMPERATURE: 99 F

## 2021-03-26 DIAGNOSIS — Z20.822 CLOSE EXPOSURE TO COVID-19 VIRUS: Primary | ICD-10-CM

## 2021-03-26 PROCEDURE — 99281 EMR DPT VST MAYX REQ PHY/QHP: CPT
